# Patient Record
Sex: MALE | Race: WHITE | NOT HISPANIC OR LATINO | Employment: FULL TIME | URBAN - METROPOLITAN AREA
[De-identification: names, ages, dates, MRNs, and addresses within clinical notes are randomized per-mention and may not be internally consistent; named-entity substitution may affect disease eponyms.]

---

## 2021-04-16 ENCOUNTER — OFFICE VISIT (OUTPATIENT)
Dept: NEUROSURGERY | Facility: CLINIC | Age: 61
End: 2021-04-16

## 2021-04-16 VITALS
WEIGHT: 222 LBS | OXYGEN SATURATION: 99 % | HEART RATE: 84 BPM | BODY MASS INDEX: 34.84 KG/M2 | TEMPERATURE: 98.2 F | HEIGHT: 67 IN

## 2021-04-16 DIAGNOSIS — M43.17 SPONDYLOLISTHESIS OF LUMBOSACRAL REGION: Primary | ICD-10-CM

## 2021-04-16 PROCEDURE — 99203 OFFICE O/P NEW LOW 30 MIN: CPT | Performed by: NEUROLOGICAL SURGERY

## 2021-04-16 RX ORDER — BACLOFEN 10 MG/1
10 TABLET ORAL AS NEEDED
COMMUNITY
End: 2021-04-16

## 2021-04-16 RX ORDER — DILTIAZEM HYDROCHLORIDE 120 MG/1
CAPSULE, COATED, EXTENDED RELEASE ORAL DAILY
COMMUNITY
Start: 2021-04-03

## 2021-04-16 RX ORDER — IBUPROFEN 800 MG/1
TABLET ORAL AS NEEDED
COMMUNITY
Start: 2021-03-19

## 2021-04-16 RX ORDER — LISINOPRIL 20 MG/1
TABLET ORAL DAILY
COMMUNITY
Start: 2021-04-03

## 2021-04-16 RX ORDER — LISINOPRIL AND HYDROCHLOROTHIAZIDE 25; 20 MG/1; MG/1
TABLET ORAL DAILY
COMMUNITY
Start: 2021-04-03

## 2021-04-16 RX ORDER — IBUPROFEN 800 MG/1
800 TABLET ORAL EVERY 6 HOURS PRN
COMMUNITY
End: 2021-04-16

## 2021-04-16 RX ORDER — LEVOTHYROXINE SODIUM 0.03 MG/1
TABLET ORAL DAILY
COMMUNITY
Start: 2021-04-03

## 2021-04-16 RX ORDER — METHOCARBAMOL 750 MG/1
750 TABLET, FILM COATED ORAL NIGHTLY
Qty: 30 TABLET | Refills: 0 | Status: SHIPPED | OUTPATIENT
Start: 2021-04-16 | End: 2021-05-16

## 2021-04-16 RX ORDER — OMEPRAZOLE 40 MG/1
CAPSULE, DELAYED RELEASE ORAL DAILY
COMMUNITY
Start: 2021-04-03

## 2021-04-16 RX ORDER — BISOPROLOL FUMARATE 5 MG/1
TABLET, FILM COATED ORAL AS NEEDED
COMMUNITY
Start: 2021-03-05

## 2021-04-16 RX ORDER — TRAMADOL HYDROCHLORIDE 50 MG/1
50 TABLET, COATED ORAL EVERY 6 HOURS PRN
Qty: 60 TABLET | Refills: 0 | Status: SHIPPED | OUTPATIENT
Start: 2021-04-16 | End: 2021-09-23

## 2021-04-16 RX ORDER — PRAVASTATIN SODIUM 20 MG
TABLET ORAL DAILY
COMMUNITY
Start: 2021-04-03

## 2021-04-16 RX ORDER — NABUMETONE 750 MG/1
750 TABLET, FILM COATED ORAL 2 TIMES DAILY
Qty: 60 TABLET | Refills: 0 | Status: SHIPPED | OUTPATIENT
Start: 2021-04-16 | End: 2021-09-23

## 2021-04-16 RX ORDER — ASPIRIN 81 MG/1
81 TABLET, CHEWABLE ORAL DAILY
COMMUNITY

## 2021-04-16 NOTE — PROGRESS NOTES
Les Blackwell  1960  [id number]      Chief Complaint   Patient presents with   • Back Pain     Low back pain, radiates to LLE   • Numbness     LLE       HISTORY OF PRESENT ILLNESS: This is a 60-year-old male who has a long history of recurring low back pain.  Pain has been primarily axial however within the last 5 to 6 years he has developed pain in his left lower extremity which he describes as numbness and tingling.  It is made worse when he is standing and walking.  He is been at pain management with epidural steroid injections which did not help.  Chiropractic is not helping either.  Lumbar MRI was performed and he is referred for neurosurgical consultation.    Past Medical History:   Diagnosis Date   • Anemia    • Arthritis    • Back problem    • Gout    • Headache    • HL (hearing loss)    • Hypertension    • Pneumonia    • Thyroid disease        Past Surgical History:   Procedure Laterality Date   • APPENDECTOMY     • CHOLECYSTECTOMY     • TURP / TRANSURETHRAL INCISION / DRAINAGE PROSTATE         Family History   Problem Relation Age of Onset   • Heart disease Mother    • Hypertension Mother    • Diabetes Mother    • Arthritis Father    • Heart disease Father    • Hypertension Father    • Kidney disease Father    • Diabetes Father    • Heart disease Brother    • Hypertension Brother    • Diabetes Brother        Social History     Socioeconomic History   • Marital status:      Spouse name: Not on file   • Number of children: Not on file   • Years of education: Not on file   • Highest education level: Not on file   Tobacco Use   • Smoking status: Former Smoker     Types: Cigarettes     Quit date:      Years since quittin.3   • Smokeless tobacco: Never Used   Vaping Use   • Vaping Use: Never used   Substance and Sexual Activity   • Alcohol use: Never   • Drug use: Never   • Sexual activity: Defer       Allergies   Allergen Reactions   • Codeine Headache     Hypertension   • Erythromycin GI  Intolerance   • Penicillins GI Bleeding   • Tetracyclines & Related Hives         Current Outpatient Medications:   •  aspirin 81 MG chewable tablet, Chew 81 mg Daily., Disp: , Rfl:   •  baclofen (LIORESAL) 10 MG tablet, Take 10 mg by mouth As Needed for Muscle Spasms., Disp: , Rfl:   •  bisoprolol (ZEBeta) 5 MG tablet, As Needed., Disp: , Rfl:   •  dilTIAZem CD (CARDIZEM CD) 120 MG 24 hr capsule, Daily., Disp: , Rfl:   •  ibuprofen (ADVIL,MOTRIN) 800 MG tablet, As Needed., Disp: , Rfl:   •  ibuprofen (ADVIL,MOTRIN) 800 MG tablet, Take 800 mg by mouth Every 6 (Six) Hours As Needed for Mild Pain ., Disp: , Rfl:   •  levothyroxine (SYNTHROID, LEVOTHROID) 25 MCG tablet, Daily., Disp: , Rfl:   •  lisinopril (PRINIVIL,ZESTRIL) 20 MG tablet, Daily., Disp: , Rfl:   •  lisinopril-hydrochlorothiazide (PRINZIDE,ZESTORETIC) 20-25 MG per tablet, Daily., Disp: , Rfl:   •  omeprazole (priLOSEC) 40 MG capsule, Daily., Disp: , Rfl:   •  pravastatin (PRAVACHOL) 20 MG tablet, Daily., Disp: , Rfl:     Review of Systems   Constitutional: Positive for fatigue. Negative for activity change, appetite change, chills, diaphoresis, fever and unexpected weight change.   HENT: Negative for congestion, dental problem, drooling, ear discharge, ear pain, facial swelling, hearing loss, mouth sores, nosebleeds, postnasal drip, rhinorrhea, sinus pressure, sinus pain, sneezing, sore throat, tinnitus, trouble swallowing and voice change.    Eyes: Negative for photophobia, pain, discharge, redness, itching and visual disturbance.   Respiratory: Positive for apnea. Negative for cough, choking, chest tightness, shortness of breath, wheezing and stridor.    Cardiovascular: Positive for leg swelling. Negative for chest pain and palpitations.   Gastrointestinal: Negative for abdominal distention, abdominal pain, anal bleeding, blood in stool, constipation, diarrhea, nausea, rectal pain and vomiting.   Endocrine: Negative for cold intolerance, heat  "intolerance, polydipsia, polyphagia and polyuria.   Genitourinary: Negative for decreased urine volume, difficulty urinating, dysuria, enuresis, flank pain, frequency, genital sores, hematuria and urgency.   Musculoskeletal: Positive for back pain. Negative for arthralgias, gait problem, joint swelling, myalgias, neck pain and neck stiffness.   Skin: Negative for color change, pallor, rash and wound.   Allergic/Immunologic: Negative for environmental allergies, food allergies and immunocompromised state.   Neurological: Positive for weakness, numbness and headaches. Negative for dizziness, tremors, seizures, syncope, facial asymmetry, speech difficulty and light-headedness.   Hematological: Negative for adenopathy. Does not bruise/bleed easily.   Psychiatric/Behavioral: Negative for agitation, behavioral problems, confusion, decreased concentration, dysphoric mood, hallucinations, self-injury, sleep disturbance and suicidal ideas. The patient is not nervous/anxious and is not hyperactive.    All other systems reviewed and are negative.      Vitals:    04/16/21 1440   Pulse: 84   Temp: 98.2 °F (36.8 °C)   SpO2: 99%   Weight: 101 kg (222 lb)   Height: 170.2 cm (67\")       Neurological Examination:    Mental status/speech: The patient is alert and oriented.  Speech is clear without aphysia or dysarthria.  No overt cognitive deficits.    Cranial nerve examination:    Olfaction: Smell is intact.  Vision: Vision is intact without visual field abnormalities.  Funduscopic examination is normal.  No pupillary irregularity.  Ocular motor examination: The extraocular muscles are intact.  There is no diplopia.  The pupil is round and reactive to both light and accommodation.  There is no nystagmus.  Facial movement/sensation: There is no facial weakness.  Sensation is intact in the first, second, and third divisions of the trigeminal nerve.  The corneal reflex is intact.  Auditory: Hearing is intact to finger rub " bilaterally.  Cranial nerves IX, X, XI, XII: Phonation is normal.  No dysphagia.  Tongue is protruded in the midline without atrophy.  The gag reflex is intact.  Shoulder shrug is normal.    Musculoligamentous ligamentous examination: BMI 34.7, weight 220 pounds.  He is obese with a protruding abdomen.  He has restriction of flexion, extension lateral rotation.  Straight leg raising, Lasègue and flip test are negative.  The deep tendon reflexes are hypoactive yet elicitable.  I do not find weakness.  No Babinski Yuri or clonus    Medical Decision Making:     Diagnostic Data Set: The lumbar MRI shows degenerative disc disease.  However at L5-S1 he has a grade 1 spondylolisthesis with bilateral facet and ligamentous hypertrophy.  At L3-L4 there appears to be a questionable focal right extraforaminal bulge.  The most significant issue however is at L5-S1.      Assessment: Symptomatic degenerative disc disease with spondylolisthesis          Recommendations: He has been through all conservative-ism without benefit.  I have given him prescription of Relafen 750 mg twice daily, Robaxin-750 milligrams at night and have asked him to return to see Dr. Rizo for surgical consideration.  His symptoms have progressed and he is at the point where decisions need done regarding best therapies.        I greatly appreciate the opportunity to see and evaluate this individual.  If you have questions or concerns regarding issues that I may have overlooked please call me at any time: [phone].  Gary Chiang M.D.  [facility]  [address] [place] [address].  [place] [id number]

## 2021-04-20 ENCOUNTER — OFFICE VISIT (OUTPATIENT)
Dept: NEUROSURGERY | Facility: CLINIC | Age: 61
End: 2021-04-20

## 2021-04-20 VITALS — TEMPERATURE: 97.5 F | WEIGHT: 225.6 LBS | HEIGHT: 67 IN | BODY MASS INDEX: 35.41 KG/M2

## 2021-04-20 DIAGNOSIS — M54.9 MECHANICAL BACK PAIN: Primary | ICD-10-CM

## 2021-04-20 DIAGNOSIS — G57.12 MERALGIA PARESTHETICA OF LEFT SIDE: ICD-10-CM

## 2021-04-20 DIAGNOSIS — M43.17 SPONDYLOLISTHESIS OF LUMBOSACRAL REGION: ICD-10-CM

## 2021-04-20 PROCEDURE — 99213 OFFICE O/P EST LOW 20 MIN: CPT | Performed by: NEUROLOGICAL SURGERY

## 2021-04-20 NOTE — PROGRESS NOTES
Patient: Les Blackwell  : 1960    Primary Care Provider: Layla Phillips APRN    Requesting Provider: As above        History    Chief Complaint: Low back and leg pain with sensory alteration.    History of Present Illness: Mr. Blackwell is a 60-year-old  who has had numerous falls and at least modest traumas over the years.  He has had chronic back pain.  [facility] and was told that his condition was nonoperative.  He has done chiropractic over the years as well as therapy.  Dr. Branch has done epidurals which were not helpful.  His pain in his is in his low back.  If he is up too long then his legs get painful and heavy.  He also gets a rather pronounced numbness in his left lateral thigh with standing.  He is better if he sits for a while.  He has to frequently change positions to get comfortable.  His pain is worse even when lying down at night in a single position.    Review of Systems   Constitutional: Negative for activity change, appetite change, chills, diaphoresis, fatigue, fever and unexpected weight change.   HENT: Negative for congestion, dental problem, drooling, ear discharge, ear pain, facial swelling, hearing loss, mouth sores, nosebleeds, postnasal drip, rhinorrhea, sinus pressure, sinus pain, sneezing, sore throat, tinnitus, trouble swallowing and voice change.    Eyes: Negative for photophobia, pain, discharge, redness, itching and visual disturbance.   Respiratory: Negative for apnea, cough, choking, chest tightness, shortness of breath, wheezing and stridor.    Cardiovascular: Negative for chest pain, palpitations and leg swelling.   Gastrointestinal: Negative for abdominal distention, abdominal pain, anal bleeding, blood in stool, constipation, diarrhea, nausea, rectal pain and vomiting.   Endocrine: Negative for cold intolerance, heat intolerance, polydipsia, polyphagia and polyuria.   Genitourinary: Negative for decreased urine  "volume, difficulty urinating, discharge, dysuria, enuresis, flank pain, frequency, genital sores, hematuria, penile pain, penile swelling, scrotal swelling, testicular pain and urgency.   Musculoskeletal: Positive for back pain and gait problem. Negative for arthralgias, joint swelling, myalgias, neck pain and neck stiffness.   Skin: Negative for color change, pallor, rash and wound.   Allergic/Immunologic: Negative for environmental allergies, food allergies and immunocompromised state.   Neurological: Positive for numbness (left leg). Negative for dizziness, tremors, seizures, syncope, facial asymmetry, speech difficulty, weakness, light-headedness and headaches.   Hematological: Negative for adenopathy. Does not bruise/bleed easily.   Psychiatric/Behavioral: Negative for agitation, behavioral problems, confusion, decreased concentration, dysphoric mood, hallucinations, self-injury, sleep disturbance and suicidal ideas. The patient is not nervous/anxious and is not hyperactive.        The patient's past medical history, past surgical history, family history, and social history have been reviewed at length in the electronic medical record.    Physical Exam:   Temp 97.5 °F (36.4 °C)   Ht 170.2 cm (67\")   Wt 102 kg (225 lb 9.6 oz)   BMI 35.33 kg/m²   MUSCULOSKELETAL:  Straight leg raising is negative.  Monty's Sign is negative.  ROM in the low back is more limited in extension.  Tenderness in the back to palpation is not observed.  NEUROLOGICAL:  Strength is intact in the lower extremities to direct testing.  Muscle tone is normal throughout.  Station and gait are normal.  Sensation is diminished light touch testing in the left lateral thigh.  Deep tendon reflexes are 1+ and symmetrical.  Coordination is intact.      Medical Decision Making    Data Review:   MRI of the lumbar spine demonstrates a prominent grade 1 listhesis of L5 on S1 with biforaminal narrowing.  Joint effusions are noted.  Modest degenerative " changes noted at other levels.    Diagnosis:   1.  Mechanical low back pain.  2.  Lumbar spondylolisthesis with potential instability.  3.  Left meralgia paresthetica.    Treatment Options:   I am going to check some flexion-extension upright lateral lumbar spine x-rays to assess for instability.  I am also going to check electrodiagnostic studies of his lower extremities.  He will follow-up thereafter.  Further recommendations will ensue.  If he is not considered a surgical candidate then I might refer him to a pain clinic setting for a trial of an epidural spinal cord stimulator.       Diagnosis Plan   1. Mechanical back pain     2. Spondylolisthesis of lumbosacral region  XR Spine Lumbar Complete With Flex & Ext       Scribed for Alexey Rizo MD by Vera Giordano DES 4/20/2021 16:33 EDT      I, Dr. Rizo, personally performed the services described in the documentation, as scribed in my presence, and it is both accurate and complete.

## 2021-05-12 ENCOUNTER — OFFICE VISIT (OUTPATIENT)
Dept: NEUROSURGERY | Facility: CLINIC | Age: 61
End: 2021-05-12

## 2021-05-12 ENCOUNTER — HOSPITAL ENCOUNTER (OUTPATIENT)
Dept: NEUROLOGY | Facility: HOSPITAL | Age: 61
Discharge: HOME | End: 2021-05-12

## 2021-05-12 ENCOUNTER — HOSPITAL ENCOUNTER (OUTPATIENT)
Dept: GENERAL RADIOLOGY | Facility: HOSPITAL | Age: 61
Discharge: HOME | End: 2021-05-12

## 2021-05-12 VITALS — BODY MASS INDEX: 35.06 KG/M2 | WEIGHT: 223.4 LBS | TEMPERATURE: 97.1 F | HEIGHT: 67 IN

## 2021-05-12 DIAGNOSIS — M43.17 SPONDYLOLISTHESIS OF LUMBOSACRAL REGION: ICD-10-CM

## 2021-05-12 DIAGNOSIS — Z00.8 PRE-SURGICAL PSYCHOLOGICAL ASSESSMENT, ENCOUNTER FOR: Primary | ICD-10-CM

## 2021-05-12 DIAGNOSIS — M54.9 MECHANICAL BACK PAIN: Primary | ICD-10-CM

## 2021-05-12 DIAGNOSIS — G57.12 MERALGIA PARESTHETICA OF LEFT SIDE: ICD-10-CM

## 2021-05-12 PROCEDURE — 95886 MUSC TEST DONE W/N TEST COMP: CPT | Performed by: PSYCHIATRY & NEUROLOGY

## 2021-05-12 PROCEDURE — 72114 X-RAY EXAM L-S SPINE BENDING: CPT

## 2021-05-12 PROCEDURE — 95886 MUSC TEST DONE W/N TEST COMP: CPT

## 2021-05-12 PROCEDURE — 95911 NRV CNDJ TEST 9-10 STUDIES: CPT

## 2021-05-12 PROCEDURE — 95911 NRV CNDJ TEST 9-10 STUDIES: CPT | Performed by: PSYCHIATRY & NEUROLOGY

## 2021-05-12 PROCEDURE — 99213 OFFICE O/P EST LOW 20 MIN: CPT | Performed by: NEUROLOGICAL SURGERY

## 2021-05-12 NOTE — PROGRESS NOTES
Patient: Les Blackwell  : 1960    Primary Care Provider: Layla Phillips APRN    Requesting Provider: As above        History    Chief Complaint: Low back and leg pain with sensory alteration.    History of Present Illness: Mr. Blackwell is a 60-year-old  who has had numerous falls and at least modest traumas over the years.  He has had chronic back pain.  [facility] and was told that his condition was nonoperative.  He has done chiropractic over the years as well as therapy.  Dr. Branch has done epidurals which were not helpful.  Most of his pain is in his low back.  If he is up too long then his legs get painful and heavy.  He also gets a rather pronounced numbness in his left lateral thigh with standing.  He is better if he sits for a while.  Driving for any length of time is quite uncomfortable.  He has to frequently change positions to get comfortable.  His pain is worse even when lying down at night in a single position.    Review of Systems   Constitutional: Negative for activity change, appetite change, chills, diaphoresis, fatigue, fever and unexpected weight change.   HENT: Negative for congestion, dental problem, drooling, ear discharge, ear pain, facial swelling, hearing loss, mouth sores, nosebleeds, postnasal drip, rhinorrhea, sinus pressure, sinus pain, sneezing, sore throat, tinnitus, trouble swallowing and voice change.    Eyes: Negative for photophobia, pain, discharge, redness, itching and visual disturbance.   Respiratory: Negative for apnea, cough, choking, chest tightness, shortness of breath, wheezing and stridor.    Cardiovascular: Negative for chest pain, palpitations and leg swelling.   Gastrointestinal: Negative for abdominal distention, abdominal pain, anal bleeding, blood in stool, constipation, diarrhea, nausea, rectal pain and vomiting.   Endocrine: Negative for cold intolerance, heat intolerance, polydipsia, polyphagia and  "polyuria.   Genitourinary: Negative for decreased urine volume, difficulty urinating, discharge, dysuria, enuresis, flank pain, frequency, genital sores, hematuria, penile pain, penile swelling, scrotal swelling, testicular pain and urgency.   Musculoskeletal: Negative for arthralgias, gait problem, joint swelling, myalgias, neck pain and neck stiffness.        Left leg   Skin: Negative for color change, pallor, rash and wound.   Allergic/Immunologic: Negative for environmental allergies, food allergies and immunocompromised state.   Neurological: Negative for dizziness, tremors, seizures, syncope, facial asymmetry, speech difficulty, weakness, light-headedness, numbness and headaches.   Hematological: Negative for adenopathy. Does not bruise/bleed easily.   Psychiatric/Behavioral: Negative for agitation, behavioral problems, confusion, decreased concentration, dysphoric mood, hallucinations, self-injury, sleep disturbance and suicidal ideas. The patient is not nervous/anxious and is not hyperactive.        The patient's past medical history, past surgical history, family history, and social history have been reviewed at length in the electronic medical record.    Physical Exam:   Temp 97.1 °F (36.2 °C)   Ht 170.2 cm (67\")   Wt 101 kg (223 lb 6.4 oz)   BMI 34.99 kg/m²   MUSCULOSKELETAL:  Straight leg raising is negative.  Monty's Sign is negative.  ROM in the low back is normal.  Tenderness in the back to palpation is not observed.  NEUROLOGICAL:  Strength is intact in the lower extremities to direct testing.  Muscle tone is normal throughout.  Station and gait are normal.  Sensation is intact to light touch testing throughout.    Medical Decision Making    Data Review:   MRI of the lumbar spine demonstrates a prominent grade 1 listhesis of L5 on S1 with biforaminal narrowing.  Joint effusions are noted.  Modest degenerative changes noted at other levels.    Flexion-extension films do not demonstrate overt " instability at L5-S1 where there is a low-grade listhesis.  There is diffuse spondylosis.    Electrodiagnostic studies of his lower extremities were normal.    Diagnosis:   1.  Mechanical low back pain.  2.  Lumbar spondylolisthesis without overt instability.  3.  Left meralgia paresthetica.    Treatment Options:   I have doubts that surgical intervention is going to be helpful.  I have advised him against that.  I discussed other treatment options.  I am going to refer him to Dr. Lundberg for consideration of an epidural spinal cord stimulator.  He will follow-up with me as needed.       Diagnosis Plan   1. Mechanical back pain     2. Spondylolisthesis of lumbosacral region  Ambulatory Referral to Pain Management       Scribed for Alexey Rizo MD by ALICIA Carr 5/12/2021 13:44 EDT      I, Dr. Rizo, personally performed the services described in the documentation, as scribed in my presence, and it is both accurate and complete.

## 2021-06-03 PROBLEM — M51.37 DEGENERATION OF LUMBAR OR LUMBOSACRAL INTERVERTEBRAL DISC: Status: ACTIVE | Noted: 2021-06-03

## 2021-06-03 PROBLEM — M48.062 LUMBAR STENOSIS WITH NEUROGENIC CLAUDICATION: Status: ACTIVE | Noted: 2021-06-03

## 2021-06-03 PROBLEM — M47.816 SPONDYLOSIS OF LUMBAR REGION WITHOUT MYELOPATHY OR RADICULOPATHY: Status: ACTIVE | Noted: 2021-06-03

## 2021-06-03 PROBLEM — M43.16 SPONDYLOLISTHESIS, LUMBAR REGION: Status: ACTIVE | Noted: 2021-06-03

## 2021-06-03 NOTE — PROGRESS NOTES
"Chief Complaint: \"Pain in my lower back and legs.\"        History of Present Illness:   Patient: Mr. Les Blackwell, 60 y.o. male   Referring Physician: Dr. Alexey Rizo   Reason for Referral: Consultation for chronic intractable lower back pain.   Pain History: Patient reports a a several year history of progressive lower back pain, which began without incident. Patient is a  who has had numerous falls and modest episodes of trauma over the past years.  Progressively, he developed chronic lower back pain. Patient reports that he was seen at the [facility] several years ago for surgical consultation and that he was found not to be a surgical candidate. Patient has failed to obtain pain relief with conservative measures including oral analgesics, physical therapy, and chiropractic therapies.  In addition, patient reports that he underwent some epidurals with Dr. Branch that were not helpful. Patient reports that most of the pain is in his lower back, although he experiences pain in both legs associated with numbness and weakness. He usually experiences more pain when standing and walking although driving for any length of time can be uncomfortable.  He has to frequently change positions to be comfortable.  Sometimes his pain is worse when laying down at night.  MRI of the lumbar spine revealed grade 1 listhesis of L5 on S1 associated with foraminal stenosis and joint effusions. There are moderate degenerative changes at all lumbar levels.  Flexion and extension x-rays did not reveal significant instability at L5-S1.  Electrodiagnostic studies of the lower extremities were normal. Les Blackwell underwent neurosurgical consultation with Dr. Alexey Rizo on 05/12/2021, and was found not to be a surgical candidate.  Dr. Rizo has recommended consideration for spinal cord stimulation. Patient underwent psychological consultation with Dr. Mita perez on Asha 3, 2021: \"From a psychological " "perspective, patient appears to be able to tolerate interventional pain procedures at this time.  Patient is considered to be an appropriate candidate for an implantable spinal cord stimulator device or an intrathecal pump device.\"  Pain has progressed in intensity over the past months.   Pain Description: Constant pain with intermittent exacerbation, described as aching, burning and throbbing sensation.   Radiation of Pain: The pain radiates into the gluteal region and down into the lower legs LT>RT associates with numbness in his legs  Pain intensity today: 7/10  Average pain intensity last week: 6/10  Pain intensity ranges from: 4/10 to 8/10  Aggravating factors: Pain increases with bending, twisting, standing, walking. Patient describes neurogenic claudication.    Alleviating factors: Pain decreases with sitting down on a recliner  Associated Symptoms:   Patient reports pain, numbness, and weakness in the lower extremities.   Patient denies any new bladder or bowel problems.   Patient denies difficulties with his balance or recent falls.     Review of previous therapies and additional medical records:  Les Blackwell has already failed the following measures, including:   Conservative Measures: Oral analgesics, topical analgesics, ice, heat, TENs, chiropractic therapy, massage, physical therapy   Interventional Measures: Epidurals with Dr. Branch without relief  Surgical Measures: No history of previous lumbar spine or hip surgery   Les Blackwell underwent neurosurgical consultation with Dr. Alexey Rizo on 05/12/2021, and was found not to be a surgical candidate.  Dr. Rizo recommended consideration for spinal cord stimulation  Patient underwent psychological consultation with Dr. Mita perez on Asha 3, 2021: \"From a psychological perspective, patient appears to be able to tolerate interventional pain procedures at this time.  Patient is considered to be an appropriate candidate for an implantable spinal cord " "stimulator device or an intrathecal pump device.\"    Les Blackwell presents with significant comorbidities including gout, headaches, hyperlipidemia, moderate obesity, hypertension, type 2 diabetes diet controlled  In terms of current analgesics, Les Blackwell takes: Tylenol, nabumetone, tramadol PRN  I have reviewed [facility] Report #[id number] (tramadol) consistent with medication reconciliation.  SOAPP: Low Risk     Global Pain Scale 06-10  2021          Pain 17          Feelings 6          Clinical outcomes 4          Activities 0          GPS Total: 27            Review of Diagnostic Studies:    MRI of the lumbar spine without contrast 3/29/2021. I have reviewed the images and the radiology report.  Grade 1 anterolisthesis of L5 on S1.  Otherwise, vertebral body heights and alignment are preserved.  Multilevel degenerative disc and facet joint disease.  Axial imaging:  L3-L4: Disc bulge, facet hypertrophy contributing to mild central canal stenosis at bilateral neuroforaminal stenosis  L4-L5: Disc bulge, facet hypertrophy resulting in mild central canal stenosis and bilateral foraminal stenosis  L5-S1: Disc bulge, facet hypertrophy, anterolisthesis, right lateral disc protrusion resulting in moderate central canal stenosis and left neuroforaminal stenosis.  There is severe right neuroforaminal stenosis  EMG/NCV on 4/20/2021.  I have reviewed the report.  Normal electrodiagnostic studies of the lower extremities  Lumbar X-rays 03/19/2021. I have reviewed the images and the radiology report.  Grade 1 anterolisthesis of L5 on S1 with possible bilateral L5 spondylolysis.  Severe lumbar facet arthropathy.    Review of Systems   Respiratory: Positive for apnea.    Musculoskeletal: Positive for back pain and myalgias.   Neurological: Positive for weakness and numbness.   All other systems reviewed and are negative.        Patient Active Problem List   Diagnosis   • Lumbar stenosis with neurogenic claudication   • " Spondylolisthesis, lumbar region   • Spondylosis of lumbar region without myelopathy or radiculopathy   • Degeneration of lumbar or lumbosacral intervertebral disc   • Physical deconditioning   • Mild obesity   • Diabetes mellitus type 2 in obese (CMS/HCC)       Past Medical History:   Diagnosis Date   • Anemia    • Arthritis    • Back problem    • Gout    • Headache    • HL (hearing loss)    • Hypertension    • Pneumonia    • Thyroid disease          Past Surgical History:   Procedure Laterality Date   • APPENDECTOMY     • CHOLECYSTECTOMY     • TURP / TRANSURETHRAL INCISION / DRAINAGE PROSTATE           Family History   Problem Relation Age of Onset   • Heart disease Mother    • Hypertension Mother    • Diabetes Mother    • Arthritis Father    • Heart disease Father    • Hypertension Father    • Kidney disease Father    • Diabetes Father    • Heart disease Brother    • Hypertension Brother    • Diabetes Brother          Social History     Socioeconomic History   • Marital status:      Spouse name: Not on file   • Number of children: Not on file   • Years of education: Not on file   • Highest education level: Not on file   Tobacco Use   • Smoking status: Former Smoker     Types: Cigarettes     Quit date:      Years since quittin.4   • Smokeless tobacco: Never Used   Vaping Use   • Vaping Use: Never used   Substance and Sexual Activity   • Alcohol use: Never   • Drug use: Never   • Sexual activity: Defer           Current Outpatient Medications:   •  aspirin 81 MG chewable tablet, Chew 81 mg Daily., Disp: , Rfl:   •  bisoprolol (ZEBeta) 5 MG tablet, As Needed., Disp: , Rfl:   •  dilTIAZem CD (CARDIZEM CD) 120 MG 24 hr capsule, Daily., Disp: , Rfl:   •  ibuprofen (ADVIL,MOTRIN) 800 MG tablet, As Needed., Disp: , Rfl:   •  levothyroxine (SYNTHROID, LEVOTHROID) 25 MCG tablet, Daily., Disp: , Rfl:   •  lisinopril (PRINIVIL,ZESTRIL) 20 MG tablet, Daily., Disp: , Rfl:   •  lisinopril-hydrochlorothiazide  (PRINZIDE,ZESTORETIC) 20-25 MG per tablet, Daily., Disp: , Rfl:   •  omeprazole (priLOSEC) 40 MG capsule, Daily., Disp: , Rfl:   •  pravastatin (PRAVACHOL) 20 MG tablet, Daily., Disp: , Rfl:   •  nabumetone (RELAFEN) 750 MG tablet, Take 1 tablet by mouth 2 (Two) Times a Day., Disp: 60 tablet, Rfl: 0  •  Ultram 50 MG tablet, Take 1 tablet by mouth Every 6 (Six) Hours As Needed for Moderate Pain ., Disp: 60 tablet, Rfl: 0      Allergies   Allergen Reactions   • Codeine Headache     Hypertension   • Erythromycin GI Intolerance   • Penicillins GI Bleeding   • Tetracyclines & Related Hives         /78 (BP Location: Right arm, Patient Position: Sitting, Cuff Size: Adult)   Pulse 84   Temp 97.8 °F (36.6 °C)   Wt 102 kg (224 lb 6.4 oz)   SpO2 98%   BMI 35.15 kg/m²       Physical Exam:  Constitutional: Patient appears well-developed, well-nourished, well-hydrated  HEENT: Head: Normocephalic and atraumatic  Eyes: Conjunctivae and lids are normal  Pupils: Equal, round, reactive to light  Neck: Trachea normal. Neck supple. No JVD present.   Pulmonary: No increased work of breathing or signs of respiratory distress. Auscultation of lungs: Clear to auscultation.   Cardiovascular: Normal rate and rhythm, normal S1 and S2, no murmurs.   Peripheral vascular exam: Femoral: right 2+, left 2+. Posterior tibialis: right 2+ and left 2+. Dorsalis pedis: right 2+ and left 2+. No edema.   Musculoskeletal   Gait and station: Gait evaluation demonstrated an antalgic gait. Able to walk on heels, toes, with some difficulties due to pain  Lumbar spine: Passive and active range of motion are limited secondary to pain. Extension, rotation of the lumbar spine increased and reproduced pain. Lumbar facet joint loading maneuvers are positive.  Monty test and Gaenslen's test are negative   Piriformis maneuvers are negative   Palpation of the bilateral greater trochanter, unrevealing   Examination of the Iliotibial band: unrevealing   Hip  joints: The range of motion of the hip joints is slightly limited to flexion and internal/external rotation, symmetrical, without pain.   Neurological:   Patient is alert and oriented to person, place, and time.   Speech: Normal.   Cortical function: Normal mental status.   Cranial nerves 2-12: intact.   Reflex Scores:  Right patellar: 0+  Left patellar: 0+  Right Achilles: 0+  Left Achilles: 0+  Motor strength: 5/5  Motor Tone: Normal .   Involuntary movements: None.   Superficial/Primitive Reflexes: Primitive reflexes were absent.   Right Galvan: Absent  Left Galvan: Absent  Right ankle clonus: Absent  Left ankle clonus: Absent   Babinsky: Absent  Long tract signs: Negative. Straight leg raising test: Positive on the left > right at 30-40 degrees with positive Lasegue. Femoral stretch sign: Negative.   Sensory exam: Intact to light touch, intact pain and temperature sensation, intact vibration sensation and normal proprioception.   Coordination: Normal finger to nose and heel to shin. Normal balance and negative Romberg's sign   Skin and subcutaneous tissue: Skin is warm and intact. No rash noted. No cyanosis.   Psychiatric: Judgment and insight: Normal. Orientation to person, place and time: Normal. Recent and remote memory: Intact. Mood and affect: Normal.     ASSESSMENT:   1. Lumbar stenosis with neurogenic claudication    2. Spondylolisthesis, lumbar region    3. Spondylosis of lumbar region without myelopathy or radiculopathy    4. Degeneration of lumbar or lumbosacral intervertebral disc    5. Diabetes mellitus type 2 in obese (CMS/HCC)    6. Mild obesity    7. Physical deconditioning          PLAN/MEDICAL DECISION MAKING:  Patient reports a a several year history of progressive lower back pain, which began without incident. Patient is a  who has had numerous falls and modest episodes of trauma over the past years.  Progressively, he developed chronic lower back pain. Patient reports that  "he was seen at the [facility] several years ago for surgical consultation and that he was found not to be a surgical candidate. Patient has failed to obtain pain relief with conservative measures including oral analgesics, physical therapy, and chiropractic therapies.  In addition, patient reports that he underwent some epidurals with Dr. Branch that were not helpful. Patient reports that most of the pain is in his lower back, although he experiences pain in both legs associated with numbness and weakness. He usually experiences more pain when standing and walking although driving for any length of time can be uncomfortable.  He has to frequently change positions to be comfortable.  Sometimes his pain is worse when laying down at night.  MRI of the lumbar spine revealed grade 1 listhesis of L5 on S1 associated with foraminal stenosis and joint effusions. There are moderate degenerative changes at all lumbar levels.  Flexion and extension x-rays did not reveal significant instability at L5-S1.  Electrodiagnostic studies of the lower extremities were normal. Les Blackwell underwent neurosurgical consultation with Dr. Alexey Rizo on 05/12/2021, and was found not to be a surgical candidate.  Dr. Rizo has recommended consideration for spinal cord stimulation. Patient underwent psychological consultation with Dr. Mita perez on Asha 3, 2021: \"From a psychological perspective, patient appears to be able to tolerate interventional pain procedures at this time.  Patient is considered to be an appropriate candidate for an implantable spinal cord stimulator device or an intrathecal pump device.\"  Pain has progressed in intensity over the past months. Patient presents with a longstanding history of unrelenting lower back and lower extremity pain refractory to conservative measures and interventional pain management measures, as referenced above. I have reviewed all available patient's medical records as well " as previous therapies as referenced above. I had a lengthy conversation with Mr. Les Blackwell regarding his chronic pain condition and potential therapeutic options including risks, benefits, alternative therapies, to name a few. Therefore, I have proposed the following plan:  1. Diagnostic studies: CBC, PT, PTT, CMP prior to SCS trial  2. Pharmacological measures: Reviewed and discussed; Patient takes Tylenol, nabumetone, tramadol PRN. Patient has declined additional pharmacological measures.  I have instructed the patient to take only one NSAID at that time  3. Interventional pain management measures:  Patient will be scheduled for a spinal cord stimulator trial with [facility]. Patient has received formal education from me including risks, benefits, and alternative treatments, as well as detailed information regarding the procedure and specific goals for the trial. Patient has also received didactic materials including educational booklets and DVDs on spinal cord stimulation therapies.  4. Long-term rehabilitation efforts:  A. The patient does not have a history of falls. I did complete a risk assessment for falls.   B. Patient will start a comprehensive physical [facility] program at  [facility] with [facility] John Tran for therapeutic exercise, core strengthening, gait and balance training, neurodynamics and Alter-G once pain is under control  C. Start an exercise program such as chair yoga and water therapy  D. Contrast therapy: Apply ice-packs for 15-20 minutes, followed by heating pads for 15-20 minutes to affected area   E. Referral to [facility]. Patient counseled on the importance of weight loss to help with overall health and pain control. Patient instructed to attempt weight loss.    5. The patient and his family have been instructed to contact my office with any questions or difficulties. The patient understands the plan and agrees to proceed accordingly.        Patient Care  Team:  Layla Phillips APRN as PCP - General (Family Medicine)  Layla Phillips APRN as Referring Physician (Family Medicine)     No orders of the defined types were placed in this encounter.        No future appointments.      Brian Lundberg MD     EMR Dragon/Transcription disclaimer:  Much of this encounter note is an electronic transcription of spoken language to printed text. Electronic transcription of spoken language may permit erroneous, or at times, nonsensical words or phrases to be inadvertently transcribed. Although I have reviewed the note for such errors, some may still exist.

## 2021-06-10 ENCOUNTER — OFFICE VISIT (OUTPATIENT)
Dept: PAIN MEDICINE | Facility: CLINIC | Age: 61
End: 2021-06-10

## 2021-06-10 VITALS
OXYGEN SATURATION: 98 % | WEIGHT: 224.4 LBS | DIASTOLIC BLOOD PRESSURE: 78 MMHG | TEMPERATURE: 97.8 F | SYSTOLIC BLOOD PRESSURE: 160 MMHG | BODY MASS INDEX: 35.15 KG/M2 | HEART RATE: 84 BPM

## 2021-06-10 DIAGNOSIS — M43.16 SPONDYLOLISTHESIS, LUMBAR REGION: ICD-10-CM

## 2021-06-10 DIAGNOSIS — E11.69 DIABETES MELLITUS TYPE 2 IN OBESE (HCC): ICD-10-CM

## 2021-06-10 DIAGNOSIS — R53.81 PHYSICAL DECONDITIONING: ICD-10-CM

## 2021-06-10 DIAGNOSIS — M51.37 DEGENERATION OF LUMBAR OR LUMBOSACRAL INTERVERTEBRAL DISC: ICD-10-CM

## 2021-06-10 DIAGNOSIS — Z01.812 PRE-PROCEDURAL LABORATORY EXAMINATION: ICD-10-CM

## 2021-06-10 DIAGNOSIS — M48.062 LUMBAR STENOSIS WITH NEUROGENIC CLAUDICATION: ICD-10-CM

## 2021-06-10 DIAGNOSIS — E66.9 MILD OBESITY: ICD-10-CM

## 2021-06-10 DIAGNOSIS — M47.816 SPONDYLOSIS OF LUMBAR REGION WITHOUT MYELOPATHY OR RADICULOPATHY: ICD-10-CM

## 2021-06-10 DIAGNOSIS — M48.062 LUMBAR STENOSIS WITH NEUROGENIC CLAUDICATION: Primary | ICD-10-CM

## 2021-06-10 DIAGNOSIS — E66.9 DIABETES MELLITUS TYPE 2 IN OBESE (HCC): ICD-10-CM

## 2021-06-10 PROCEDURE — 99204 OFFICE O/P NEW MOD 45 MIN: CPT | Performed by: ANESTHESIOLOGY

## 2021-06-16 ENCOUNTER — TELEPHONE (OUTPATIENT)
Dept: PAIN MEDICINE | Facility: CLINIC | Age: 61
End: 2021-06-16

## 2021-06-16 NOTE — TELEPHONE ENCOUNTER
Patient was called and informed that we had faxed over his completed FMLA and will be sending him his copy.

## 2021-07-09 DIAGNOSIS — M48.062 LUMBAR STENOSIS WITH NEUROGENIC CLAUDICATION: Primary | ICD-10-CM

## 2021-08-16 ENCOUNTER — TELEPHONE (OUTPATIENT)
Dept: PAIN MEDICINE | Facility: CLINIC | Age: 61
End: 2021-08-16

## 2021-08-16 NOTE — TELEPHONE ENCOUNTER
Provider: VIVIANE  Caller: CARLOS MANUEL CRAWLEY  Relationship to Patient: SELF    Phone Number: [phone]  Reason for Call: PT WAS TO HAVE SPINAL CORD STIMULATION AT [facility] ON 7.19.21.  PATIENT IS CALLING TO SEE WHEN THIS MIGHT BE RESCHEDULED, HE HAS NOT HEARD ANYTHING

## 2021-08-17 NOTE — TELEPHONE ENCOUNTER
Spoke with [facility] Radiology department. They are attempting to resend MRI with addendum. Patient notified.   No further needs expressed.    Yes

## 2021-08-18 NOTE — TELEPHONE ENCOUNTER
Appropriate documentation received from [facility]. Given to Insurance and placed in scan basket.

## 2021-09-08 DIAGNOSIS — M48.062 LUMBAR STENOSIS WITH NEUROGENIC CLAUDICATION: Primary | ICD-10-CM

## 2021-09-17 ENCOUNTER — TELEPHONE (OUTPATIENT)
Dept: PAIN MEDICINE | Facility: CLINIC | Age: 61
End: 2021-09-17

## 2021-09-17 NOTE — TELEPHONE ENCOUNTER
Spoke with pt regarding lab work results from [facility]. Pt wanted to verify our office received them due to having a procedure Monday. Spoke with the hospital and received faxed labs. Advised pt. Kimberly is scanning into the chart.

## 2021-09-20 ENCOUNTER — OUTSIDE FACILITY SERVICE (OUTPATIENT)
Dept: PAIN MEDICINE | Facility: CLINIC | Age: 61
End: 2021-09-20

## 2021-09-20 PROBLEM — Z46.2 ENCOUNTER FOR FITTING AND ADJUSTMENT OF NEUROPACEMAKER OF SPINAL CORD: Status: ACTIVE | Noted: 2021-09-20

## 2021-09-20 PROCEDURE — 63650 IMPLANT NEUROELECTRODES: CPT | Performed by: ANESTHESIOLOGY

## 2021-09-20 PROCEDURE — 99152 MOD SED SAME PHYS/QHP 5/>YRS: CPT | Performed by: ANESTHESIOLOGY

## 2021-09-20 NOTE — PROGRESS NOTES
"Chief Complaint: \"I did not get much relief during the stimulator trial.\"      Brief History: Mr. Les Blackwell is a 61 y.o. male, who returns to the clinic for possible spinal cord stimulator reprogramming and removal of spinal cord stimulator trial leads placed on 09/20/2021. Mr. Les Blackwell reports no relief of his chronic lower back and lower extremity pain or any functional improvement with the use of his stimulator device.   Les Blackwell did not agree to return to the clinic for SCS reprogramming during the trial. Patient was able to operate his stimulator device without difficulties. Patient did not take additional analgesics throughout his spinal cord stimulator trial. He has remained afebrile throughout the trial. Dressings are dry and intact. Patient denies any complications related to the procedure.   Patient is not satisfied with the trial.   Pain level is rated as 7/10 with the stimulator \"turned on.”   Patient level ranges from 4/10 to 7/10 with the use of the spinal cord stimulator.      Pain History: Mr. Les Blackwell, 61 y.o. male was originally referred by Dr. Alexey Rizo in consultation for chronic intractable lower back pain. Patient reports a several year history of progressive lower back pain, which began without incident. Patient is a  who has had numerous falls and modest episodes of trauma over the past years.  Progressively, he developed chronic lower back pain. Patient reports that he was seen at the [facility] several years ago for surgical consultation and that he was found not to be a surgical candidate. Patient has failed to obtain pain relief with conservative measures including oral analgesics, physical therapy, and chiropractic therapies.  In addition, patient reports that he underwent some epidurals with Dr. Branch that were not helpful. Patient reports that most of the pain is in his lower back, although he experiences pain in both legs associated with " "numbness and weakness. He usually experiences more pain when standing and walking although driving for any length of time can be uncomfortable.  He has to frequently change positions to be comfortable.  Sometimes his pain is worse when laying down at night.  MRI of the lumbar spine revealed grade 1 listhesis of L5 on S1 associated with foraminal stenosis and joint effusions. There are moderate degenerative changes at all lumbar levels.  Flexion and extension x-rays did not reveal significant instability at L5-S1.  Electrodiagnostic studies of the lower extremities were normal. Les Blackwell underwent neurosurgical consultation with Dr. Alexey Rizo on 05/12/2021, and was found not to be a surgical candidate.  Dr. Rizo has recommended consideration for spinal cord stimulation. Patient underwent psychological consultation with Dr. Mita perez on Asha 3, 2021: \"From a psychological perspective, patient appears to be able to tolerate interventional pain procedures at this time.  Patient is considered to be an appropriate candidate for an implantable spinal cord stimulator device or an intrathecal pump device.\"  Pain has progressed in intensity over the past months.   Pain Description: Constant pain with intermittent exacerbation, described as aching, burning and throbbing sensation.   Radiation of Pain: The pain radiates into the gluteal region and down into the lower legs LT>RT associates with numbness in his legs  Pain intensity today: 7/10  Average pain intensity last week: 6/10  Pain intensity ranges from: 4/10 to 8/10  Aggravating factors: Pain increases with bending, twisting, standing, walking. Patient describes neurogenic claudication.    Alleviating factors: Pain decreases with sitting down on a recliner  Associated Symptoms:   Patient reports pain, numbness, and weakness in the lower extremities.   Patient denies any new bladder or bowel problems.   Patient denies difficulties with his balance or " "recent falls.      Review of previous therapies and additional medical records:  Les Blackwell has already failed the following measures, including:   Conservative Measures: Oral analgesics, topical analgesics, ice, heat, TENs, chiropractic therapy, massage, physical therapy   Interventional Measures: Epidurals with Dr. Branch without relief  Surgical Measures: No history of previous lumbar spine or hip surgery   Les Blackwell underwent neurosurgical consultation with Dr. Alexey Rizo on 05/12/2021, and was found not to be a surgical candidate.  Dr. Rizo recommended consideration for spinal cord stimulation  Patient underwent psychological consultation with Dr. Mita perez on Asha 3, 2021: \"From a psychological perspective, patient appears to be able to tolerate interventional pain procedures at this time.  Patient is considered to be an appropriate candidate for an implantable spinal cord stimulator device or an intrathecal pump device.\"    Les Blackwell presents with significant comorbidities including gout, headaches, hyperlipidemia, moderate obesity, hypertension, type 2 diabetes diet controlled  In terms of current analgesics, Les Blackwell takes: Tylenol, nabumetone, tramadol PRN  I have reviewed [facility] Report #[id number] (tramadol) consistent with medication reconciliation.  SOAPP: Low Risk     Global Pain Scale 06-10  2021                Pain 17                 Feelings 6                 Clinical outcomes 4                 Activities 0                 GPS Total: 27                     Diagnostic Studies:   MRI of the lumbar spine without contrast 3/29/2021. I have reviewed the images and the radiology report.  Grade 1 anterolisthesis of L5 on S1.  Otherwise, vertebral body heights and alignment are preserved.  Multilevel degenerative disc and facet joint disease.  Axial imaging:  L3-L4: Disc bulge, facet hypertrophy contributing to mild central canal stenosis at bilateral neuroforaminal stenosis  L4-L5: Disc " "bulge, facet hypertrophy resulting in mild central canal stenosis and bilateral foraminal stenosis  L5-S1: Disc bulge, facet hypertrophy, anterolisthesis, right lateral disc protrusion resulting in moderate central canal stenosis and left neuroforaminal stenosis.  There is severe right neuroforaminal stenosis  EMG/NCV on 4/20/2021.  I have reviewed the report.  Normal electrodiagnostic studies of the lower extremities  Lumbar X-rays 03/19/2021. I have reviewed the images and the radiology report.  Grade 1 anterolisthesis of L5 on S1 with possible bilateral L5 spondylolysis.  Severe lumbar facet arthropathy.     The following portions of the patient's history were reviewed and updated as appropriate: problem list, past medical history, past surgery history, social history, family history, medications, and allergies    Review of Systems   Musculoskeletal: Positive for back pain.   All other systems reviewed and are negative.      /82 (BP Location: Right arm, Patient Position: Sitting, Cuff Size: Adult)   Pulse 82   Ht 170.2 cm (67\")   Wt 103 kg (226 lb 6.4 oz)   SpO2 97%   BMI 35.46 kg/m²       Physical Exam   Constitutional: Patient appears well-developed, well-nourished, well-hydrated  HEENT: Head: Normocephalic and atraumatic  Eyes: Conjunctivae and lids are normal  Pupils: Equal, round, reactive to light  Neck: Trachea normal. Neck supple. No JVD present.   Musculoskeletal   Gait and station: Gait evaluation demonstrated a normal gait   Neurological:   Patient is alert and oriented to person, place, and time.   Speech: Normal.   Cortical function: Normal mental status.   Motor strength: 5/5  Motor Tone: Normal .   Involuntary movements: None.   Straight leg raising test: Negative.   Sensory exam: Intact to light touch, intact pain and temperature sensation, intact vibration sensation and normal proprioception.   Coordination: Romberg's sign: Negative   Skin and subcutaneous tissue: Skin is warm and " intact. No rash noted. No cyanosis. The stimulator placement site looks unremarkable without erythema, drainage or fluid accumulation  Psychiatric: Judgment and insight: Normal. Recent and remote memory: Intact. Mood and affect: Normal.     PROCEDURES:   Procedure #1: Analysis of the spinal cord stimulator device with complex spinal cord stimulator reprogramming   Patient used the [facility] spinal cord stimulator device 100% of the time since initiation of the trial phase. The spinal cord stimulator device was reprogrammed under my direct supervision and more than 10 programs were created by adjusting electrode polarities, amplitude, pulse width, pulse rate, BurstDR, micro-dosing, in the following fashion;    Program TWO (BEST PROGRAM):   Electrode polarities: 4+, 7-, 12+, 15-  Amplitudes: 0.5-2 mA   Pulse width: 1000 mcs  Rate: 40 Hz  Intraburst rate: 500 Hz  Micro-dose cyclin:90     Patient experienced coverage with pleasant paresthesia in all areas of chronic pain but reported no relief.  Time spent reprogrammin minutes  A copy of the telemetry report will be scanned in the patient's chart.    Procedure #2: Removal of spinal cord stimulator trial leads.   Two leads were removed with tips intact. There is no erythema, drainage, or fluid accumulation at the site. The area was cleansed with chlorhexidine.  A small Covaderm was applied.     ASSESSMENT:   1. Lumbar stenosis with neurogenic claudication    2. Degeneration of lumbar or lumbosacral intervertebral disc    3. Spondylolisthesis, lumbar region    4. Spondylosis of lumbar region without myelopathy or radiculopathy    5. Diabetes mellitus type 2 in obese (CMS/HCC)    6. Mild obesity    7. Physical deconditioning    8. Encounter for fitting and adjustment of neuropacemaker of spinal cord        PLAN/MEDICAL DECISION MAKING: Patient's chronic pain condition has not been significantly improved during his SCS trial. Patient presents with a several year  "history of progressive lower back pain, which began without incident. Patient reports that he was seen at the [facility] several years ago for surgical consultation and that he was found not to be a surgical candidate. Patient has failed to obtain pain relief with conservative measures including oral analgesics, physical therapy, and chiropractic therapies.  In addition, he underwent epidurals with Dr. Branch that were not helpful. Patient reports that most of the pain is in his lower back, although it's associated with numbness and weakness in his lower extremities. MRI of the lumbar spine revealed grade 1 listhesis of L5 on S1 associated with foraminal stenosis and joint effusions. There are moderate degenerative changes at all lumbar levels.  Flexion and extension x-rays did not reveal significant instability at L5-S1. Electrodiagnostic studies of the lower extremities were normal. Les Blackwell underwent neurosurgical consultation with Dr. Alexey Rizo on 05/12/2021, and was found not to be a surgical candidate. Dr. Rizo has recommended consideration for spinal cord stimulation. Patient underwent psychological consultation with Dr. Mita perez on Asha 3, 2021: \"From a psychological perspective, patient appears to be able to tolerate interventional pain procedures at this time.  Patient is considered to be an appropriate candidate for an implantable spinal cord stimulator device or an intrathecal pump device.\"  Pain has progressed in intensity over the past months. Patient presents with a longstanding history of unrelenting lower back and lower extremity pain refractory to conservative measures and interventional pain management measures, as referenced above. I have reviewed all available patient's medical records as well as previous therapies as referenced above. I had a lengthy conversation with Mr. Les Blackwell regarding his chronic pain condition and potential therapeutic options including " risks, benefits, alternative therapies, to name a few. Patient is not satisfied with the outcome of his SCS trial. Therefore, I have proposed the following plan:  1.  Follow-up with Dr. Alexey Rizo as patient has failed a spinal cord stimulator trial with [facility].  I have discussed prospects of trying a different stimulator device working by different mechanisms such as Nevro, or trying an intrathecal pump device.  Patient may follow-up with ARLINE Venegas on as-needed basis.  2. Pharmacological measures: Reviewed and discussed; Patient takes Tylenol, nabumetone, tramadol PRN. Patient has declined additional pharmacological measures.  I have instructed the patient to take only one NSAID at that time  3. Long-term rehabilitation efforts:  A. The patient does not have a history of falls. I did complete a risk assessment for falls.   B. Patient will start a comprehensive physical [facility] program at  [facility] with [facility] John Tran for therapeutic exercise, core strengthening, gait and balance training, neurodynamics and Alter-G once pain is under control  C. Start an exercise program such as chair yoga and water therapy  D. Contrast therapy: Apply ice-packs for 15-20 minutes, followed by heating pads for 15-20 minutes to affected area   E. Referral to [facility]. Patient counseled on the importance of weight loss to help with overall health and pain control. Patient instructed to attempt weight loss.    4. The patient has been instructed to contact my office with any questions or difficulties. The patient understands the plan and agrees to proceed accordingly.      Patient Care Team:  Layla Phillips APRN as PCP - General (Family Medicine)  Layla Phillips APRN as Referring Physician (Family Medicine)     No orders of the defined types were placed in this encounter.        No future appointments.      Brian Lundberg MD      EMR Dragon/Transcription  disclaimer:  Much of this encounter note is an electronic transcription of spoken language to printed text. Electronic transcription of spoken language may permit erroneous, or at times, nonsensical words or phrases to be inadvertently transcribed. Although I have reviewed the note for such errors, some may still exist.

## 2021-09-21 ENCOUNTER — TELEPHONE (OUTPATIENT)
Dept: PAIN MEDICINE | Facility: CLINIC | Age: 61
End: 2021-09-21

## 2021-09-21 NOTE — TELEPHONE ENCOUNTER
Spoke with pt regarding yesterday’s procedure with Dr Dallas. Pt stated they were feeling well other than some soreness in his back. dvised can alternate heat and ice on the area, and to alternate ibuprofen/advil.  Pt also stated that Clara with  [facility] advised that he can turn the stimulator up every couple of hours.Pt understood. Advised of next appointment Thursday 9/23/21 @ 808.

## 2021-09-23 ENCOUNTER — OFFICE VISIT (OUTPATIENT)
Dept: PAIN MEDICINE | Facility: CLINIC | Age: 61
End: 2021-09-23

## 2021-09-23 VITALS
WEIGHT: 226.4 LBS | HEIGHT: 67 IN | SYSTOLIC BLOOD PRESSURE: 130 MMHG | OXYGEN SATURATION: 97 % | BODY MASS INDEX: 35.53 KG/M2 | DIASTOLIC BLOOD PRESSURE: 82 MMHG | HEART RATE: 82 BPM

## 2021-09-23 DIAGNOSIS — M47.816 SPONDYLOSIS OF LUMBAR REGION WITHOUT MYELOPATHY OR RADICULOPATHY: ICD-10-CM

## 2021-09-23 DIAGNOSIS — Z46.2 ENCOUNTER FOR FITTING AND ADJUSTMENT OF NEUROPACEMAKER OF SPINAL CORD: ICD-10-CM

## 2021-09-23 DIAGNOSIS — R53.81 PHYSICAL DECONDITIONING: ICD-10-CM

## 2021-09-23 DIAGNOSIS — M48.062 LUMBAR STENOSIS WITH NEUROGENIC CLAUDICATION: ICD-10-CM

## 2021-09-23 DIAGNOSIS — E66.9 MILD OBESITY: ICD-10-CM

## 2021-09-23 DIAGNOSIS — E11.69 DIABETES MELLITUS TYPE 2 IN OBESE (HCC): ICD-10-CM

## 2021-09-23 DIAGNOSIS — M43.16 SPONDYLOLISTHESIS, LUMBAR REGION: ICD-10-CM

## 2021-09-23 DIAGNOSIS — M51.37 DEGENERATION OF LUMBAR OR LUMBOSACRAL INTERVERTEBRAL DISC: ICD-10-CM

## 2021-09-23 DIAGNOSIS — E66.9 DIABETES MELLITUS TYPE 2 IN OBESE (HCC): ICD-10-CM

## 2021-09-23 PROCEDURE — 99024 POSTOP FOLLOW-UP VISIT: CPT | Performed by: ANESTHESIOLOGY

## 2021-09-23 PROCEDURE — 95972 ALYS CPLX SP/PN NPGT W/PRGRM: CPT | Performed by: ANESTHESIOLOGY

## 2021-09-23 RX ORDER — FLUTICASONE PROPIONATE 50 MCG
SPRAY, SUSPENSION (ML) NASAL
COMMUNITY
Start: 2021-09-01

## 2021-09-23 RX ORDER — ERGOCALCIFEROL 1.25 MG/1
CAPSULE ORAL
COMMUNITY
Start: 2021-09-14

## 2021-10-04 ENCOUNTER — TELEPHONE (OUTPATIENT)
Dept: NEUROSURGERY | Facility: CLINIC | Age: 61
End: 2021-10-04

## 2021-10-04 NOTE — TELEPHONE ENCOUNTER
Caller: CARLOS MANUEL CRAWLEY     Relationship to patient: SELF     Best call back number: [phone]    Chief complaint: DID NOT GET RELIEF DURING SCS TRIAL    Type of visit: FOLLOW UP     Requested date: ASAP     If rescheduling, when is the original appointment: ASAP     Additional notes: PATIENT IS CALLING TO SCHEDULE AN APPT WITH DR. GONSALES FOR A RE EVALUATION. LAST SAW HIM 05/12/21 AND DR. GONSALES REFERRED HIM TO [facility] FOR A SCS TRIAL. PATIENT STATED HE COMPLETED THIS AND DID NOT GET RELIEF. HE WOULD LIKE TO KNOW THE NEXT STEPS. PLEASE REVIEW AND ADVISE.